# Patient Record
Sex: MALE | Race: WHITE | URBAN - METROPOLITAN AREA
[De-identification: names, ages, dates, MRNs, and addresses within clinical notes are randomized per-mention and may not be internally consistent; named-entity substitution may affect disease eponyms.]

---

## 2017-12-29 ENCOUNTER — OFFICE VISIT (OUTPATIENT)
Dept: ORTHOPEDICS | Facility: CLINIC | Age: 22
End: 2017-12-29
Payer: COMMERCIAL

## 2017-12-29 ENCOUNTER — RADIANT APPOINTMENT (OUTPATIENT)
Dept: GENERAL RADIOLOGY | Facility: CLINIC | Age: 22
End: 2017-12-29
Attending: FAMILY MEDICINE
Payer: COMMERCIAL

## 2017-12-29 VITALS
WEIGHT: 154 LBS | BODY MASS INDEX: 20.41 KG/M2 | DIASTOLIC BLOOD PRESSURE: 70 MMHG | SYSTOLIC BLOOD PRESSURE: 125 MMHG | HEART RATE: 61 BPM | HEIGHT: 73 IN

## 2017-12-29 DIAGNOSIS — S69.92XA INJURY OF LEFT WRIST, INITIAL ENCOUNTER: Primary | ICD-10-CM

## 2017-12-29 DIAGNOSIS — S69.92XA INJURY OF LEFT WRIST, INITIAL ENCOUNTER: ICD-10-CM

## 2017-12-29 NOTE — PROGRESS NOTES
NEW PATIENT INTAKE QUESTIONNAIRE  SPORTS & ORTHOPEDIC WALK-IN 12/29/2017    Primary Care Physician: None    Where are the majority of your medical records? [facility], but unsure    Fell while skiing in [place] 3 weeks ago.     Reason for Visit:    What part of your body is injured / painful?  left wrist    What caused the injury /pain? Fall, skiing in [place] 12/8/17    How long ago did your injury occur or pain begin? several weeks ago    What are your most bothersome symptoms? Other:     How would you characterize your symptom? Only occasional pain    What makes your symptoms better? Other: Cast    What makes your symptoms worse? Not in much pain    Have you been previously seen for this problem? Yes, UC in [place]    Medical History:    Medical History: None    Have you had surgery on this body part before? 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 No    Do you have any vision problems? No    Do you have any chest pain or edema? No    Do you have any shortness of breath or wheezing?  No    Do you have stomach problems? No    Do you have any numbness or focal weakness? No    Do you have diabetes? No    Do you have problems with bleeding or clotting? No    Do you have an rashes or other skin lesions? No    Communication:    How did you hear about us? Internet: Google ad    Who else should know about this visit? None

## 2017-12-29 NOTE — LETTER
12/29/2017       RE: Alexys Holloway  [address]  [place] [address]     Dear Colleague,    Thank you for referring your patient, Alexys Holloway, to the [facility] AND ORTHOPAED[facility] WALK [facility] CLINIC at [facility]. Please see a copy of my visit note below.         NEW PATIENT INTAKE QUESTIONNAIRE  SPORTS & ORTHOPEDIC WALK-IN 12/29/2017    Primary Care Physician: None    Where are the majority of your medical records? [facility], but unsure    Fell while skiing in [place] 3 weeks ago.     Reason for Visit:    What part of your body is injured / painful?  left wrist    What caused the injury /pain? Fall, skiing in [place] 12/8/17    How long ago did your injury occur or pain begin? several weeks ago    What are your most bothersome symptoms? Other:     How would you characterize your symptom? Only occasional pain    What makes your symptoms better? Other: Cast    What makes your symptoms worse? Not in much pain    Have you been previously seen for this problem? Yes, UC in [place]    Medical History:    Medical History: None    Have you had surgery on this body part before? 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 No    Do you have any vision problems? No    Do you have any chest pain or edema? No    Do you have any shortness of breath or wheezing?  No    Do you have stomach problems? No    Do you have any numbness or focal weakness? No    Do you have diabetes? No    Do you have problems with bleeding or clotting? No    Do you have an rashes or other skin lesions? No    Communication:    How did you hear about us? Internet: Google ad    Who else should know about this visit? None    "        SUBJECTIVE: Alexys Holloway is a 22 year old male who injuried left wrist.  New to skiing, moved to [place], grew up here snowboarding.  Had an appt in [place] to get the cast off to have a removable splint.  Not going back to [place].  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    Fractured left wrist about 10 years ago. Playing soccer and fractured the wrist.  Had to be reduced.    PAST MEDICAL, SOCIAL, SURGICAL AND FAMILY HISTORY: He  has a past medical history of Wrist fracture, left.  He  has a past surgical history that includes no history of surgery.  His family history is negative for CANCER.  He reports that he has never smoked. He has never used smokeless tobacco. He reports that he drinks about 3.0 oz of alcohol per week  He reports that he does not use illicit drugs.        ALLERGIES: He has No Known Allergies.    CURRENT MEDICATIONS: He has a current medication list which includes the following prescription(s): isotretinoin.     REVIEW OF SYSTEMS: 10 point review of systems is negative except as noted above.    EXAM:  /70  Pulse 61  Ht 1.854 m (6' 1\")  Wt 69.9 kg (154 lb)  BMI 20.32 kg/m2  CONSTITUTIONIAL: healthy, alert, no distress and cooperative  HEAD: Normocephalic. No masses, lesions, tenderness or abnormalities  ENT: ENT exam normal, no neck nodes or sinus tenderness  SKIN: no suspicious lesions or rashes  GAIT: normal  Stance: normal  NEUROLOGIC: Normal muscle tone and strength, reflexes normal, sensation grossly normal.  PSYCHIATRIC: affect normal/bright and mentation appears normal.    MUSCULOSKELETAL: left wrist fracture  WRIST:  Inspection: casted, out of cast- nontender radius, mild decrease in extension  Palpation: Tender: in extension  Non-tender: distal radius, distal ulna  Range of Motion: flexion:  full, extension:  decreased  Strength:  strength mild decrease on left.    Special tests: radial, ulnar and median nerve function intact    ELBOW:  elbow exam not " done        ASSESSMENT/PLAN:  Pt is a 21 yo white male with PMHx of left wrist fracture with reduction presenting with left wrist radius fracture  1. Left wrist radius fracture- casted 3 weeks, pt reports discussion with Ortho to have cast removed and removable splint  Explained the risks and benefits of continued casting vs. Splint, pt reports accepts risks and will go with the removable splint  Pt aware that further healing is needed  Will obtain f/u if needed at other location [place] vs[place]  Will seek hand therapy if restricted ROM    RTC prn  X-RAY INTERPRETATION:   X-Ray of the Left Wrist: 3-view, ap, lateral, oblique  ordered and interpreted in the office today was positive for Impression:   1. Transverse, nondisplaced fracture of the distal left radius with  mild volar angulation. Evaluation is limited by overlying cast  material, however, early callus formation appears to be present.  2. On lateral view, possible osseous density is noted along the  posterior aspect of the triquetrum, however, overlying cast material  limits assessment for additional fracture.    Again, thank you for allowing me to participate in the care of your patient.      Sincerely,    Cece Smiley MD

## 2017-12-29 NOTE — PROGRESS NOTES
"SUBJECTIVE: Alexys Holloway is a 22 year old male who injuried left wrist.  New to skiing, moved to [place], grew up here snowboarding.  Had an appt in [place] to get the cast off to have a removable splint.  Not going back to [place].  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    Fractured left wrist about 10 years ago. Playing soccer and fractured the wrist.  Had to be reduced.    PAST MEDICAL, SOCIAL, SURGICAL AND FAMILY HISTORY: He  has a past medical history of Wrist fracture, left.  He  has a past surgical history that includes no history of surgery.  His family history is negative for CANCER.  He reports that he has never smoked. He has never used smokeless tobacco. He reports that he drinks about 3.0 oz of alcohol per week  He reports that he does not use illicit drugs.        ALLERGIES: He has No Known Allergies.    CURRENT MEDICATIONS: He has a current medication list which includes the following prescription(s): isotretinoin.     REVIEW OF SYSTEMS: 10 point review of systems is negative except as noted above.    EXAM:  /70  Pulse 61  Ht 1.854 m (6' 1\")  Wt 69.9 kg (154 lb)  BMI 20.32 kg/m2  CONSTITUTIONIAL: healthy, alert, no distress and cooperative  HEAD: Normocephalic. No masses, lesions, tenderness or abnormalities  ENT: ENT exam normal, no neck nodes or sinus tenderness  SKIN: no suspicious lesions or rashes  GAIT: normal  Stance: normal  NEUROLOGIC: Normal muscle tone and strength, reflexes normal, sensation grossly normal.  PSYCHIATRIC: affect normal/bright and mentation appears normal.    MUSCULOSKELETAL: left wrist fracture  WRIST:  Inspection: casted, out of cast- nontender radius, mild decrease in extension  Palpation: Tender: in extension  Non-tender: distal radius, distal ulna  Range of Motion: flexion:  full, extension:  decreased  Strength:  strength mild decrease on left.    Special tests: radial, ulnar and median nerve function intact    ELBOW:  elbow exam not " done        ASSESSMENT/PLAN:  Pt is a 21 yo white male with PMHx of left wrist fracture with reduction presenting with left wrist radius fracture  1. Left wrist radius fracture- casted 3 weeks, pt reports discussion with Ortho to have cast removed and removable splint  Explained the risks and benefits of continued casting vs. Splint, pt reports accepts risks and will go with the removable splint  Pt aware that further healing is needed  Will obtain f/u if needed at other location [place] vs[place]  Will seek hand therapy if restricted ROM    RTC prn  X-RAY INTERPRETATION:   X-Ray of the Left Wrist: 3-view, ap, lateral, oblique  ordered and interpreted in the office today was positive for Impression:   1. Transverse, nondisplaced fracture of the distal left radius with  mild volar angulation. Evaluation is limited by overlying cast  material, however, early callus formation appears to be present.  2. On lateral view, possible osseous density is noted along the  posterior aspect of the triquetrum, however, overlying cast material  limits assessment for additional fracture.

## 2017-12-29 NOTE — NURSING NOTE
Cast removal:    Relevant Diagnosis: L distal radius fx    Patient educated on cast removal process: Yes      Short arm cast was removed per physician instruction.    Skin was observed and found to be intact with no signs of concern:Yes     Concern noted: None     Person(s) involved in removal:   Patient     Questions asked: None    Patient sent to x-ray: Yes

## 2017-12-29 NOTE — LETTER
Date:January 2, 2018      Patient was self referred, no letter generated. Do not send.        [facility] Health Information

## 2017-12-29 NOTE — MR AVS SNAPSHOT
After Visit Summary   2017    Alexys Holloway    MRN: [id number]           Patient Information     Date Of Birth          1995        Visit Information        Provider Department      2017 8:50 AM Cece Smiley MD [facility]        Today's Diagnoses     Injury of left wrist, initial encounter    -  1       Follow-ups after your visit        Follow-up notes from your care team     Return if symptoms worsen or fail to improve.      Who to contact     Please call your clinic at [phone] to:    Ask questions about your health    Make or cancel appointments    Discuss your medicines    Learn about your test results    Speak to your doctor   If you have compliments or concerns about an experience at your clinic, or if you wish to file a complaint, please contact [facility] Patient Relations at [phone] or email us at [email or web address]         Additional Information About Your Visit        MyChart Information     [employer] is an electronic gateway that provides easy, online access to your medical records. With [employer], you can request a clinic appointment, read your test results, renew a prescription or communicate with your care team.     To sign up for [employer] visit the website at [email or web address]   You will be asked to enter the access code listed below, as well as some personal information. Please follow the directions to create your username and password.     Your access code is: [email or web address]  Expires: 3/29/2018 11:26 AM     Your access code will  in 90 days. If you need help or a new code, please contact your [facility] or call [phone] for assistance.        Care EveryWhere ID     This is your Care EveryWhere ID. This could be used by other organizations to access your [facility] medical records  [id number]        Your Vitals Were      "Pulse Height BMI (Body Mass Index)             61 6' 1\" (1.854 m) 20.32 kg/m2          Blood Pressure from Last 3 Encounters:   12/29/17 125/70    Weight from Last 3 Encounters:   12/29/17 154 lb (69.9 kg)               Primary Care Provider    None Specified       No primary provider on file.        Equal Access to Services     [facility]: Hadii aad ku hadsatindero Soomaali, waaxda luqadaha, qaybta kaalmada adetedyada, tracie bowles maureenranda wright sailajagraciela fisherIsiahindiran . So [facility] [phone].    ATENCIÓN: Si habla español, tiene a rogers disposición servicios gratuitos de asistencia lingüística. Ronaldame al [phone].    We comply with applicable federal civil rights laws and [place] laws. We do not discriminate on the basis of race, color, national origin, age, disability, sex, sexual orientation, or gender identity.            Thank you!     Thank you for choosing [facility] SPORTS AND ORTHOPAEDIC WALK IN CLINIC  for your care. Our goal is always to provide you with excellent care. Hearing back from our patients is one way we can continue to improve our services. Please take a few minutes to complete the written survey that you may receive in the mail after your visit with us. Thank you!             Your Updated Medication List - Protect others around you: Learn how to safely use, store and throw away your medicines at www.disposemymeds.org.          This list is accurate as of: 12/29/17 11:26 AM.  Always use your most recent med list.                   Brand Name Dispense Instructions for use Diagnosis    ACCUTANE PO             "

## 2019-12-30 ENCOUNTER — WALK IN (OUTPATIENT)
Dept: URGENT CARE | Age: 24
End: 2019-12-30

## 2019-12-30 ENCOUNTER — TELEPHONE (OUTPATIENT)
Dept: URGENT CARE | Age: 24
End: 2019-12-30

## 2019-12-30 VITALS
HEART RATE: 45 BPM | RESPIRATION RATE: 16 BRPM | DIASTOLIC BLOOD PRESSURE: 62 MMHG | OXYGEN SATURATION: 100 % | WEIGHT: 153.44 LBS | TEMPERATURE: 97.5 F | SYSTOLIC BLOOD PRESSURE: 100 MMHG

## 2019-12-30 DIAGNOSIS — J02.9 SORE THROAT: Primary | ICD-10-CM

## 2019-12-30 LAB
BASOPHILS # BLD AUTO: 0 K/MCL (ref 0–0.3)
BASOPHILS NFR BLD AUTO: 0 %
DIFFERENTIAL METHOD BLD: NORMAL
EOSINOPHIL # BLD AUTO: 0.1 K/MCL (ref 0.1–0.5)
EOSINOPHIL NFR SPEC: 1 %
ERYTHROCYTE [DISTWIDTH] IN BLOOD: 13.3 % (ref 11–15)
HCT VFR BLD CALC: 44.3 % (ref 39–51)
HETEROPH AB SERPL QL IA: NEGATIVE
HGB BLD-MCNC: 15.2 G/DL (ref 13–17)
INTERNAL PROCEDURAL CONTROLS ACCEPTABLE: YES
LYMPHOCYTES # BLD MANUAL: 1.9 K/MCL (ref 1–4.8)
LYMPHOCYTES NFR BLD MANUAL: 40 %
MCH RBC QN AUTO: 30.1 PG (ref 26–34)
MCHC RBC AUTO-ENTMCNC: 34.3 G/DL (ref 32–36.5)
MCV RBC AUTO: 87.7 FL (ref 78–100)
MONOCYTES # BLD MANUAL: 0.6 K/MCL (ref 0.3–0.9)
MONOCYTES NFR BLD MANUAL: 12 %
NEUTROPHILS # BLD: 2.3 K/MCL (ref 1.8–7.7)
NEUTROPHILS NFR BLD AUTO: 47 %
PLATELET # BLD: 155 K/MCL (ref 140–450)
RBC # BLD: 5.05 MIL/MCL (ref 4.5–5.9)
S PYO AG THROAT QL IA.RAPID: NEGATIVE
WBC # BLD: 4.8 K/MCL (ref 4.2–11)

## 2019-12-30 PROCEDURE — 99213 OFFICE O/P EST LOW 20 MIN: CPT | Performed by: FAMILY MEDICINE

## 2019-12-30 PROCEDURE — 87880 STREP A ASSAY W/OPTIC: CPT | Performed by: FAMILY MEDICINE

## 2019-12-30 PROCEDURE — 36415 COLL VENOUS BLD VENIPUNCTURE: CPT | Performed by: FAMILY MEDICINE

## 2019-12-30 PROCEDURE — 85025 COMPLETE CBC W/AUTO DIFF WBC: CPT | Performed by: INTERNAL MEDICINE

## 2019-12-30 PROCEDURE — 86308 HETEROPHILE ANTIBODY SCREEN: CPT | Performed by: INTERNAL MEDICINE

## 2019-12-30 PROCEDURE — 87081 CULTURE SCREEN ONLY: CPT | Performed by: INTERNAL MEDICINE

## 2019-12-30 RX ORDER — AMOXICILLIN 875 MG/1
875 TABLET, COATED ORAL 2 TIMES DAILY
Qty: 14 TABLET | Refills: 0 | Status: SHIPPED | OUTPATIENT
Start: 2019-12-30 | End: 2020-01-06

## 2020-01-01 LAB
REPORT STATUS (RPT): NORMAL
S PYO SPEC QL CULT: NORMAL
SPECIMEN SOURCE: NORMAL